# Patient Record
Sex: FEMALE | Race: WHITE | NOT HISPANIC OR LATINO | Employment: FULL TIME | URBAN - METROPOLITAN AREA
[De-identification: names, ages, dates, MRNs, and addresses within clinical notes are randomized per-mention and may not be internally consistent; named-entity substitution may affect disease eponyms.]

---

## 2017-09-26 ENCOUNTER — APPOINTMENT (OUTPATIENT)
Dept: GENERAL RADIOLOGY | Facility: HOSPITAL | Age: 47
End: 2017-09-26

## 2017-09-26 ENCOUNTER — HOSPITAL ENCOUNTER (EMERGENCY)
Facility: HOSPITAL | Age: 47
Discharge: HOME | End: 2017-09-26
Attending: EMERGENCY MEDICINE | Admitting: EMERGENCY MEDICINE

## 2017-09-26 ENCOUNTER — APPOINTMENT (OUTPATIENT)
Dept: ULTRASOUND IMAGING | Facility: HOSPITAL | Age: 47
End: 2017-09-26

## 2017-09-26 VITALS
BODY MASS INDEX: 36.8 KG/M2 | DIASTOLIC BLOOD PRESSURE: 91 MMHG | SYSTOLIC BLOOD PRESSURE: 139 MMHG | RESPIRATION RATE: 18 BRPM | TEMPERATURE: 98.5 F | WEIGHT: 200 LBS | HEART RATE: 59 BPM | HEIGHT: 62 IN | OXYGEN SATURATION: 96 %

## 2017-09-26 DIAGNOSIS — I15.8 OTHER SECONDARY HYPERTENSION: ICD-10-CM

## 2017-09-26 DIAGNOSIS — R60.9 EDEMA, UNSPECIFIED TYPE: Primary | ICD-10-CM

## 2017-09-26 DIAGNOSIS — N28.9 RENAL INSUFFICIENCY: ICD-10-CM

## 2017-09-26 LAB
ANION GAP SERPL CALCULATED.3IONS-SCNC: 15 MMOL/L
BASOPHILS # BLD AUTO: 0.08 10*3/MM3 (ref 0–0.2)
BASOPHILS NFR BLD AUTO: 1.2 % (ref 0–2)
BUN BLD-MCNC: 7 MG/DL (ref 6–20)
BUN/CREAT SERPL: 5.4 (ref 7–25)
CALCIUM SPEC-SCNC: 8.8 MG/DL (ref 8.6–10.5)
CHLORIDE SERPL-SCNC: 103 MMOL/L (ref 98–107)
CO2 SERPL-SCNC: 23 MMOL/L (ref 22–29)
CREAT BLD-MCNC: 1.3 MG/DL (ref 0.57–1)
DEPRECATED RDW RBC AUTO: 44.1 FL (ref 37–54)
EOSINOPHIL # BLD AUTO: 0.11 10*3/MM3 (ref 0.1–0.3)
EOSINOPHIL NFR BLD AUTO: 1.7 % (ref 0–4)
ERYTHROCYTE [DISTWIDTH] IN BLOOD BY AUTOMATED COUNT: 12.9 % (ref 11.5–14.5)
ERYTHROCYTE [SEDIMENTATION RATE] IN BLOOD: 15 MM/HR (ref 0–20)
GFR SERPL CREATININE-BSD FRML MDRD: 44 ML/MIN/1.73
GLUCOSE BLD-MCNC: 103 MG/DL (ref 65–99)
HCT VFR BLD AUTO: 41.3 % (ref 37–47)
HGB BLD-MCNC: 14 G/DL (ref 12–16)
IMM GRANULOCYTES # BLD: 0.01 10*3/MM3 (ref 0–0.03)
IMM GRANULOCYTES NFR BLD: 0.2 % (ref 0–0.5)
LYMPHOCYTES # BLD AUTO: 1.98 10*3/MM3 (ref 0.6–4.8)
LYMPHOCYTES NFR BLD AUTO: 30.5 % (ref 20–45)
MCH RBC QN AUTO: 31.5 PG (ref 27–31)
MCHC RBC AUTO-ENTMCNC: 33.9 G/DL (ref 31–37)
MCV RBC AUTO: 93 FL (ref 81–99)
MONOCYTES # BLD AUTO: 0.3 10*3/MM3 (ref 0–1)
MONOCYTES NFR BLD AUTO: 4.6 % (ref 3–8)
NEUTROPHILS # BLD AUTO: 4.02 10*3/MM3 (ref 1.5–8.3)
NEUTROPHILS NFR BLD AUTO: 61.8 % (ref 45–70)
NRBC BLD MANUAL-RTO: 0 /100 WBC (ref 0–0)
PLATELET # BLD AUTO: 285 10*3/MM3 (ref 140–500)
PMV BLD AUTO: 10.7 FL (ref 7.4–10.4)
POTASSIUM BLD-SCNC: 3.7 MMOL/L (ref 3.5–5.2)
RBC # BLD AUTO: 4.44 10*6/MM3 (ref 4.2–5.4)
SODIUM BLD-SCNC: 141 MMOL/L (ref 136–145)
WBC NRBC COR # BLD: 6.5 10*3/MM3 (ref 4.8–10.8)

## 2017-09-26 PROCEDURE — 73130 X-RAY EXAM OF HAND: CPT

## 2017-09-26 PROCEDURE — 80048 BASIC METABOLIC PNL TOTAL CA: CPT | Performed by: EMERGENCY MEDICINE

## 2017-09-26 PROCEDURE — 99283 EMERGENCY DEPT VISIT LOW MDM: CPT

## 2017-09-26 PROCEDURE — 99282 EMERGENCY DEPT VISIT SF MDM: CPT | Performed by: EMERGENCY MEDICINE

## 2017-09-26 PROCEDURE — 85025 COMPLETE CBC W/AUTO DIFF WBC: CPT | Performed by: EMERGENCY MEDICINE

## 2017-09-26 PROCEDURE — 85652 RBC SED RATE AUTOMATED: CPT | Performed by: EMERGENCY MEDICINE

## 2017-09-26 PROCEDURE — 93971 EXTREMITY STUDY: CPT

## 2017-09-26 RX ORDER — HYDROCHLOROTHIAZIDE 25 MG/1
TABLET ORAL
Qty: 30 TABLET | Refills: 0 | Status: SHIPPED | OUTPATIENT
Start: 2017-09-26 | End: 2020-03-13 | Stop reason: ALTCHOICE

## 2020-03-13 ENCOUNTER — OFFICE VISIT (OUTPATIENT)
Dept: ORTHOPEDIC SURGERY | Facility: CLINIC | Age: 50
End: 2020-03-13

## 2020-03-13 VITALS
BODY MASS INDEX: 30.73 KG/M2 | SYSTOLIC BLOOD PRESSURE: 151 MMHG | WEIGHT: 167 LBS | DIASTOLIC BLOOD PRESSURE: 98 MMHG | HEART RATE: 87 BPM | HEIGHT: 62 IN

## 2020-03-13 DIAGNOSIS — M75.02 ADHESIVE CAPSULITIS OF LEFT SHOULDER: ICD-10-CM

## 2020-03-13 DIAGNOSIS — R52 PAIN: Primary | ICD-10-CM

## 2020-03-13 PROCEDURE — 99203 OFFICE O/P NEW LOW 30 MIN: CPT | Performed by: NURSE PRACTITIONER

## 2020-03-13 PROCEDURE — 20610 DRAIN/INJ JOINT/BURSA W/O US: CPT | Performed by: NURSE PRACTITIONER

## 2020-03-13 PROCEDURE — 73030 X-RAY EXAM OF SHOULDER: CPT | Performed by: NURSE PRACTITIONER

## 2020-03-13 RX ORDER — LIDOCAINE HYDROCHLORIDE 10 MG/ML
4 INJECTION, SOLUTION EPIDURAL; INFILTRATION; INTRACAUDAL; PERINEURAL
Status: COMPLETED | OUTPATIENT
Start: 2020-03-13 | End: 2020-03-13

## 2020-03-13 RX ORDER — TRIAMCINOLONE ACETONIDE 40 MG/ML
80 INJECTION, SUSPENSION INTRA-ARTICULAR; INTRAMUSCULAR
Status: COMPLETED | OUTPATIENT
Start: 2020-03-13 | End: 2020-03-13

## 2020-03-13 RX ORDER — PREDNISONE 10 MG/1
TABLET ORAL
Qty: 39 TABLET | Refills: 0 | Status: SHIPPED | OUTPATIENT
Start: 2020-03-13

## 2020-03-13 RX ORDER — LEVOTHYROXINE SODIUM 112 UG/1
112 TABLET ORAL DAILY
COMMUNITY
Start: 2020-01-10 | End: 2020-04-09

## 2020-03-13 RX ADMIN — TRIAMCINOLONE ACETONIDE 80 MG: 40 INJECTION, SUSPENSION INTRA-ARTICULAR; INTRAMUSCULAR at 11:22

## 2020-03-13 RX ADMIN — LIDOCAINE HYDROCHLORIDE 4 ML: 10 INJECTION, SOLUTION EPIDURAL; INFILTRATION; INTRACAUDAL; PERINEURAL at 11:22

## 2020-03-13 NOTE — PROGRESS NOTES
Subjective:     Patient ID: Heavenly Tay is a 49 y.o. female.    Chief Complaint:  Left shoulder pain   History of Present Illness  Heavenly Tay 49-year-old female who presents to clinic for evaluation of left shoulder pain.  Approximately 2 months ago began noticing pain maximal tenderness present the lateral aspect of the acromion of the left shoulder.  She is right-hand dominant.  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  Denies known specific injury woke 1 morning with pain felt that she had slept wrong she presented to outside facility x-rays were completed instructed that she was having a shoulder strain she was placed in a sling for approximately a week given ibuprofen 800 mg and discharged.  She has recently been to see her primary care approximately 2 weeks ago has been encouraged to follow-up in our office.  Currently taking ibuprofen 800 mg 1 to 2 tablets as needed twice daily on a consistent basis.  Denies previous injury to the shoulder denies previously experiencing similar symptoms in the past.  Rates discomfort at worst 7-8 out of a 10 aching, stabbing, sharp, shooting in nature.  Is not experiencing numbness or tingling radiating down the left upper extremity.  She is experiencing significant decreased range of motion when reaching up above her head such as when she is trying to fix her hair, reaching back behind her back she is limited to resting at side only.  She does have history of thyroid disease denies any history of diabetes.  She is continued with work doing most activity at her side.  Pain is not radiating to the side of her neck.  She is not currently using a sling again increased pain noted with reaching out to side, reaching up above head.  Denies all other concerns present this time.     Social History     Occupational History   • Not on file   Tobacco Use   • Smoking status: Current Every Day Smoker     Packs/day: 0.50   • Smokeless tobacco: Never Used   Substance and  "Sexual Activity   • Alcohol use: No   • Drug use: No   • Sexual activity: Defer      History reviewed. No pertinent past medical history.  History reviewed. No pertinent surgical history.    History reviewed. No pertinent family history.      Review of Systems   Constitutional: Negative for chills, diaphoresis, fever and unexpected weight change.   HENT: Negative for hearing loss, nosebleeds, sore throat and tinnitus.    Eyes: Negative for pain and visual disturbance.   Respiratory: Negative for cough, shortness of breath and wheezing.    Cardiovascular: Negative for chest pain and palpitations.   Gastrointestinal: Negative for abdominal pain, diarrhea, nausea and vomiting.   Endocrine: Negative for cold intolerance, heat intolerance and polydipsia.   Genitourinary: Negative for difficulty urinating, dysuria and hematuria.   Musculoskeletal: Positive for myalgias. Negative for arthralgias and joint swelling.   Skin: Negative for rash and wound.   Allergic/Immunologic: Negative for environmental allergies.   Neurological: Negative for dizziness, syncope and numbness.   Hematological: Does not bruise/bleed easily.   Psychiatric/Behavioral: Negative for dysphoric mood and sleep disturbance. The patient is not nervous/anxious.            Objective:  Physical Exam    Vital signs reviewed.   General: No acute distress.  Eyes: conjunctiva clear; pupils equally round and reactive  ENT: external ears and nose atraumatic; oropharynx clear  CV: no peripheral edema  Resp: normal respiratory effort  Skin: no rashes or wounds; normal turgor  Psych: mood and affect appropriate; recent and remote memory intact    Vitals:    03/13/20 1036   BP: 151/98   BP Location: Left arm   Pulse: 87   Weight: 75.8 kg (167 lb)   Height: 157.5 cm (62\")         03/13/20  1036   Weight: 75.8 kg (167 lb)     Body mass index is 30.54 kg/m².      Left Shoulder Exam     Tenderness   The patient is experiencing tenderness in the acromion.    Range of " Motion   External rotation: 30   Forward flexion: 90     Muscle Strength   Internal rotation: 3/5   External rotation: 3/5   Supraspinatus: 3/5   Subscapularis: 3/5   Biceps: 3/5     Other   Erythema: absent  Sensation: normal  Pulse: present     Comments:  Internal rotation to side  Maximal tenderness present the lateral acromion, anterior joint line                 Imaging:  Left Shoulder X-Ray  Indication: Pain  AP Internal and External Rotation views    Findings:  No fracture  No bony lesion  Normal soft tissues  Normal joint spaces  AC joint arthropathy   No prior studies were available for comparison.    Assessment:        1. Pain    2. Adhesive capsulitis of left shoulder           Plan:   1.  Discussed plan of care with patient.  Wish to proceed with corticosteroid injection glenohumeral joint of the left shoulder.  Discussed any relief that she received today will be from lidocaine will take up to 1 to 2 weeks before the steroid injection will start helping.  Discussed we will start prednisone 10 mg tablets today 60 mg x 3 days 40 mg x 3 days 20 mg x 3 days then 10 mg x 3 days then discontinue medication.  We will plan to see her back in clinic in 3 weeks.  Discussed focus on range of motion we will have her follow-up with physical therapy as soon as possible.  She verbalized understanding of all information agrees with plan of care.  Denies other concerns present this time.  Large Joint Arthrocentesis: L glenohumeral  Date/Time: 3/13/2020 11:22 AM  Consent given by: patient  Site marked: site marked  Timeout: Immediately prior to procedure a time out was called to verify the correct patient, procedure, equipment, support staff and site/side marked as required   Supporting Documentation  Indications: pain   Procedure Details  Location: shoulder - L glenohumeral  Preparation: Patient was prepped and draped in the usual sterile fashion  Needle size: 22 G  Approach: anterior  Medications administered: 4 mL  lidocaine PF 1% 1 %; 80 mg triamcinolone acetonide 40 MG/ML  Patient tolerance: patient tolerated the procedure well with no immediate complications            Orders:  Orders Placed This Encounter   Procedures   • XR Shoulder 2+ View Left   • Ambulatory Referral to Physical Therapy Evaluate and treat       Medications:  No orders of the defined types were placed in this encounter.      Followup:  No follow-ups on file.    Heavenly was seen today for pain.    Diagnoses and all orders for this visit:    Pain  -     XR Shoulder 2+ View Left    Adhesive capsulitis of left shoulder  -     Ambulatory Referral to Physical Therapy Evaluate and treat          I ordered and reviewed the YESENIA today.     Dictated utilizing Dragon dictation

## 2020-03-20 ENCOUNTER — HOSPITAL ENCOUNTER (OUTPATIENT)
Dept: PHYSICAL THERAPY | Facility: HOSPITAL | Age: 50
Setting detail: THERAPIES SERIES
Discharge: HOME | End: 2020-03-20

## 2020-03-20 DIAGNOSIS — M75.02 ADHESIVE CAPSULITIS OF LEFT SHOULDER: Primary | ICD-10-CM

## 2020-03-20 PROCEDURE — 97110 THERAPEUTIC EXERCISES: CPT

## 2020-03-20 PROCEDURE — 97140 MANUAL THERAPY 1/> REGIONS: CPT

## 2020-03-20 PROCEDURE — 97161 PT EVAL LOW COMPLEX 20 MIN: CPT

## 2020-03-20 NOTE — THERAPY EVALUATION
Outpatient Physical Therapy Ortho Initial Evaluation  [facility]     Patient Name: Heavenly Tay  : 1970  MRN: [id number]  Today's Date: 3/20/2020      Visit Date: 2020    Patient Active Problem List   Diagnosis   • Adhesive capsulitis of left shoulder        No past medical history on file.     No past surgical history on file.    Visit Dx:     ICD-10-CM ICD-9-CM   1. Adhesive capsulitis of left shoulder M75.02 726.0         Patient History     Row Name 20 1300             History    Chief Complaint  Difficulty with daily activities;Joint stiffness;Muscle tenderness;Muscle weakness;Pain  -AS      Type of Pain  Shoulder pain Left  -AS      Brief Description of Current Complaint  Patient states that several montsh ago she started having left shoulder pain and over the next few months it gradually worsened and now she can barely use her left arm at all with ADL's. She states she has had an x-ray that was negative. Had an injection and a HEP from her MD that has helped.   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  Mamta Nix  -AS      How has patient tried to help current problem?  rest, exercises  -AS      What clinical tests have you had for this problem?  X-ray  -AS      Results of Clinical Tests  Negative  -AS         Pain     Pain Location  Shoulder  -AS      Pain at Present  5  -AS      Pain at Best  0  -AS      Pain at Worst  8  -AS      Pain Frequency  Intermittent  -AS      Pain Description  Aching  -AS      What Performance Factors Make the Current Problem(s) WORSE?  use of LUE  -AS      What Performance Factors Make the Current Problem(s) BETTER?  rest  -AS         Daily Activities    Primary Language  English  -AS       Are you able to read  Yes  -AS      Are you able to write  Yes  -AS      How does patient learn best?  Listening;Reading  -AS      Teaching needs identified  Home Exercise Program;Management of Condition  -AS      Patient is concerned about/has problems with  Difficulty with self care (i.e. bathing, dressing, toileting:;Flexibility;Performing home management (household chores, shopping, care of dependents);Performing job responsibilities/community activities (work, school,;Performing sports, recreation, and play activities;Reaching over head;Repetitive movements of the hand, arm, shoulder  -AS      Does patient have problems with the following?  None  -AS      Barriers to learning  None  -AS      Pt Participated in POC and Goals  Yes  -AS         Safety    Are you being hurt, hit, or frightened by anyone at home or in your life?  No  -AS      Are you being neglected by a caregiver  No  -AS        User Key  (r) = Recorded By, (t) = Taken By, (c) = Cosigned By    Initials Name Provider Type    AS Bari Vargas, PT Physical Therapist          PT Ortho     Row Name 03/20/20 1300       Precautions and Contraindications    Precautions/Limitations  no known precautions/limitations  -AS       Subjective Pain    Able to rate subjective pain?  yes  -AS    Pre-Treatment Pain Level  5  -AS    Post-Treatment Pain Level  4  -AS       Posture/Observations    Posture- WNL  Posture is WNL  -AS       Left Upper Ext    Lt Shoulder Abduction AROM  76  -AS    Lt Shoulder Abduction PROM  119  -AS    Lt Shoulder Flexion AROM  105  -AS    Lt Shoulder Flexion PROM  124  -AS    Lt Shoulder External Rotation AROM  20  -AS    Lt Shoulder External Rotation PROM  42  -AS    Lt Shoulder Internal Rotation AROM  24  -AS    Lt Shoulder Internal Rotation PROM  53  -AS       MMT Left Upper Ext    Lt Shoulder Flexion MMT, Gross Movement  (4-/5) good minus  -AS    Lt Shoulder ABduction MMT, Gross Movement  (4-/5) good minus  -AS    Lt  Shoulder Internal Rotation MMT, Gross Movement  (4-/5) good minus  -AS    Lt Shoulder External Rotation MMT, Gross Movement  (4-/5) good minus  -AS       Sensation    Sensation WNL?  WNL  -AS    Light Touch  No apparent deficits  -AS      User Key  (r) = Recorded By, (t) = Taken By, (c) = Cosigned By    Initials Name Provider Type    AS Bari Vargas, PT Physical Therapist                      Therapy Education  Given: HEP, Symptoms/condition management, Pain management  Program: New  How Provided: Verbal, Demonstration, Written  Provided to: Patient  Level of Understanding: Teach back education performed, Verbalized     PT OP Goals     Row Name 03/20/20 1300          PT Short Term Goals    STG Date to Achieve  04/10/20  -AS     STG 1  Patient to demonstrate compliance with her initial HEP for flexibility, ROM, and strengthening.  -AS     STG 2  Patient to report right shoulder pain on VAS of 4-5/10 at worst with activity.  -AS     STG 3  Patient to demonstrate improved right shoulder PROM to within 10 degrees of contralateral shoulder in all planes.  -AS        Long Term Goals    LTG Date to Achieve  05/01/20  -AS     LTG 1  Patient to demonstrate compliance with her advanced HEP for flexibility, ROM, and strengthening.  -AS     LTG 2  Patient to report right shoulder pain on VAS of 1-2/10 at worst with activity.  -AS     LTG 3  Patient to demonstrate improved right shoulder AROM to within 10 degrees of contralateral shoulder in all planes.  -AS     LTG 4  Patient to report improved function and decreased pain on Quick DASH by >10-15 points.  -AS        Time Calculation    PT Goal Re-Cert Due Date  04/17/20  -AS       User Key  (r) = Recorded By, (t) = Taken By, (c) = Cosigned By    Initials Name Provider Type    AS Bari Vargas, PT Physical Therapist          PT Assessment/Plan     Row Name 03/20/20 1300          PT Assessment    Functional Limitations  Limitation in home management;Limitations in  community activities;Performance in leisure activities;Performance in self-care ADL;Performance in sport activities;Performance in work activities  -AS     Impairments  Impaired flexibility;Joint mobility;Muscle strength;Pain;Range of motion  -AS     Assessment Comments  Patient presents to outpatient PT with a diagnosis of left shoulder adhesive capsulitis. Patient has very limited LUE ROM, limited LUE strength, and increased left shoulder pain with activity. Patient has limited function at this time secondary to the above.  -AS     Please refer to paper survey for additional self-reported information  Yes  -AS     Rehab Potential  Good  -AS     Patient/caregiver participated in establishment of treatment plan and goals  Yes  -AS     Patient would benefit from skilled therapy intervention  Yes  -AS        PT Plan    PT Frequency  2x/week  -AS     Predicted Duration of Therapy Intervention (Therapy Eval)  4-6 weeks  -AS     Planned CPT's?  PT RE-EVAL: 97454;PT THER PROC EA 15 MIN: 80967;PT THER ACT EA 15 MIN: 38936;PT MANUAL THERAPY EA 15 MIN: 23302;PT NEUROMUSC RE-EDUCATION EA 15 MIN: 92501;PT ELECTRICAL STIM UNATTEND: ;PT ULTRASOUND EA 15 MIN: 96263;PT HOT/COLD PACK WC NONMCARE: 02031  -AS       User Key  (r) = Recorded By, (t) = Taken By, (c) = Cosigned By    Initials Name Provider Type    AS Bari Vargas, PT Physical Therapist          Modalities     Row Name 03/20/20 1300             Moist Heat    MH Applied  Yes  -AS      Location  Left Shoulder  -AS      Rx Minutes  10 mins  -AS      MH Prior to Rx  Yes  -AS        User Key  (r) = Recorded By, (t) = Taken By, (c) = Cosigned By    Initials Name Provider Type    AS Bari Vargas, PT Physical Therapist        OP Exercises     Row Name 03/20/20 1300             Subjective Pain    Able to rate subjective pain?  yes  -AS      Pre-Treatment Pain Level  5  -AS      Post-Treatment Pain Level  4  -AS         Exercise 1    Exercise Name 1  3-Way  Cane  -AS      Reps 1  25 each  -AS         Exercise 2    Exercise Name 2  IR Towel Stretch  -AS      Reps 2  10  -AS      Time 2  10 sec hold each  -AS         Exercise 3    Exercise Name 3  Sleeper Stretch  -AS      Reps 3  10  -AS      Time 3  10 sec hold each  -AS         Exercise 4    Exercise Name 4  Wall Slides - Flex & ABD  -AS      Time 4  3 min each  -AS         Exercise 5    Exercise Name 5  Pulleys - Flex & ABD  -AS      Time 5  3 min each  -AS        User Key  (r) = Recorded By, (t) = Taken By, (c) = Cosigned By    Initials Name Provider Type    AS Bari Vargas, PT Physical Therapist           Manual Rx (last 36 hours)      Manual Treatments     Row Name 03/20/20 1300             Manual Rx 1    Manual Rx 1 Location  Left Shoulder  -AS      Manual Rx 1 Type  PROM - 4-Way  -AS      Manual Rx 1 Duration  15 min  -AS        User Key  (r) = Recorded By, (t) = Taken By, (c) = Cosigned By    Initials Name Provider Type    AS Bari Vargas, PT Physical Therapist                      Outcome Measure Options: Quick DASH  Quick DASH  Open a tight or new jar.: Mild Difficulty  Do heavy household chores (e.g., wash walls, wash floors): Moderate Difficulty  Carry a shopping bag or briefcase: Severe Difficulty  Wash your back: Severe Difficulty  Use a knife to cut food: No Difficulty  Recreational activities in which you take some force or impact through your arm, should or hand (e.g. golf, hammering, tennis, etc.): No Difficulty  During the past week, to what extent has your arm, shoulder, or hand problem interfered with your normal social activites with family, friends, neighbors or groups?: Slightly  During the past week, were you limited in your work or other regular daily activities as a result of your arm, shoulder or hand problem?: Moderately Limited  Arm, Shoulder, or hand pain: Moderate  Tingling (pins and needles) in your arm, shoulder, or hand: Moderate  During the past week, how much  difficulty have you had sleeping because of the pain in your arm, shoulder or hand?: Moderate Difficiculty  Number of Questions Answered: 11  Quick DASH Score: 40.91  Work Module (Optional)  Using your usual technique for your work?: Moderate Difficulty  Doing your usual work because of arm, shoulder or hand pain?: Moderate Difficulty  Doing your work as well as you would like?: Severe Difficulty  Spending your usual amount of time doing your work?: Mild Difficulty  Work Module Score: 50         Time Calculation:     Start Time: 1135  Stop Time: 1238  Time Calculation (min): 63 min     Therapy Charges for Today     Code Description Service Date Service Provider Modifiers Qty    74125313851 HC PT EVAL LOW COMPLEXITY 2 3/20/2020 Bari Vargas, PT GP 1    68553697783 HC PT THER PROC EA 15 MIN 3/20/2020 aBri Vargas, PT GP 1    66742936356 HC PT MANUAL THERAPY EA 15 MIN 3/20/2020 Bari Vargas, PT GP 1          PT G-Codes  Outcome Measure Options: Quick DASH  Quick DASH Score: 40.91         Bari Vargas, PT  3/20/2020

## 2025-07-30 ENCOUNTER — HOSPITAL ENCOUNTER (EMERGENCY)
Facility: HOSPITAL | Age: 55
Discharge: HOME | End: 2025-07-31
Attending: STUDENT IN AN ORGANIZED HEALTH CARE EDUCATION/TRAINING PROGRAM | Admitting: STUDENT IN AN ORGANIZED HEALTH CARE EDUCATION/TRAINING PROGRAM
Payer: COMMERCIAL

## 2025-07-30 VITALS
SYSTOLIC BLOOD PRESSURE: 150 MMHG | TEMPERATURE: 98 F | RESPIRATION RATE: 16 BRPM | OXYGEN SATURATION: 96 % | DIASTOLIC BLOOD PRESSURE: 94 MMHG | HEART RATE: 79 BPM

## 2025-07-30 DIAGNOSIS — M54.50 ACUTE RIGHT-SIDED LOW BACK PAIN WITHOUT SCIATICA: Primary | ICD-10-CM

## 2025-07-30 PROCEDURE — 99283 EMERGENCY DEPT VISIT LOW MDM: CPT | Performed by: STUDENT IN AN ORGANIZED HEALTH CARE EDUCATION/TRAINING PROGRAM

## 2025-07-31 ENCOUNTER — APPOINTMENT (OUTPATIENT)
Dept: GENERAL RADIOLOGY | Facility: HOSPITAL | Age: 55
End: 2025-07-31
Payer: COMMERCIAL

## 2025-07-31 PROCEDURE — 72100 X-RAY EXAM L-S SPINE 2/3 VWS: CPT

## 2025-07-31 PROCEDURE — 72220 X-RAY EXAM SACRUM TAILBONE: CPT

## 2025-07-31 RX ORDER — OXYCODONE AND ACETAMINOPHEN 5; 325 MG/1; MG/1
1 TABLET ORAL ONCE
Refills: 0 | Status: COMPLETED | OUTPATIENT
Start: 2025-07-31 | End: 2025-07-31

## 2025-07-31 RX ORDER — NAPROXEN 250 MG/1
500 TABLET ORAL ONCE
Status: COMPLETED | OUTPATIENT
Start: 2025-07-31 | End: 2025-07-31

## 2025-07-31 RX ORDER — NAPROXEN 500 MG/1
500 TABLET ORAL 2 TIMES DAILY PRN
Qty: 20 TABLET | Refills: 0 | Status: SHIPPED | OUTPATIENT
Start: 2025-07-31

## 2025-07-31 RX ADMIN — OXYCODONE AND ACETAMINOPHEN 1 TABLET: 5; 325 TABLET ORAL at 00:47

## 2025-07-31 RX ADMIN — NAPROXEN 500 MG: 250 TABLET ORAL at 00:47
